# Patient Record
Sex: MALE | Race: WHITE | NOT HISPANIC OR LATINO | Employment: OTHER | URBAN - METROPOLITAN AREA
[De-identification: names, ages, dates, MRNs, and addresses within clinical notes are randomized per-mention and may not be internally consistent; named-entity substitution may affect disease eponyms.]

---

## 2023-03-14 ENCOUNTER — HOSPITAL ENCOUNTER (OUTPATIENT)
Facility: HOSPITAL | Age: 58
Discharge: OTHER INSTITUTION | End: 2023-03-15
Attending: EMERGENCY MEDICINE | Admitting: INTERNAL MEDICINE

## 2023-03-14 DIAGNOSIS — I21.4 NSTEMI (NON-ST ELEVATED MYOCARDIAL INFARCTION): ICD-10-CM

## 2023-03-14 DIAGNOSIS — I48.91 ATRIAL FIBRILLATION: ICD-10-CM

## 2023-03-14 DIAGNOSIS — R06.02 SHORTNESS OF BREATH: ICD-10-CM

## 2023-03-14 DIAGNOSIS — I48.91 ATRIAL FIBRILLATION WITH RVR: ICD-10-CM

## 2023-03-14 DIAGNOSIS — R07.9 CHEST PAIN: ICD-10-CM

## 2023-03-14 PROBLEM — R06.00 DYSPNEA: Status: ACTIVE | Noted: 2023-03-14

## 2023-03-14 PROBLEM — I10 PRIMARY HYPERTENSION: Status: ACTIVE | Noted: 2023-03-14

## 2023-03-14 PROBLEM — Z71.89 ACP (ADVANCE CARE PLANNING): Status: ACTIVE | Noted: 2023-03-14

## 2023-03-14 PROBLEM — R79.89 ELEVATED TROPONIN: Status: ACTIVE | Noted: 2023-03-14

## 2023-03-14 PROBLEM — E78.5 HYPERLIPIDEMIA: Status: ACTIVE | Noted: 2023-03-14

## 2023-03-14 PROBLEM — E66.01 MORBID OBESITY: Status: ACTIVE | Noted: 2023-03-14

## 2023-03-14 LAB
ALBUMIN SERPL BCP-MCNC: 4 G/DL (ref 3.5–5.2)
ALP SERPL-CCNC: 86 U/L (ref 55–135)
ALT SERPL W/O P-5'-P-CCNC: 46 U/L (ref 10–44)
ANION GAP SERPL CALC-SCNC: 11 MMOL/L (ref 8–16)
AST SERPL-CCNC: 29 U/L (ref 10–40)
BASOPHILS # BLD AUTO: 0.07 K/UL (ref 0–0.2)
BASOPHILS NFR BLD: 0.7 % (ref 0–1.9)
BILIRUB SERPL-MCNC: 0.6 MG/DL (ref 0.1–1)
BNP SERPL-MCNC: 23 PG/ML (ref 0–99)
BUN SERPL-MCNC: 16 MG/DL (ref 6–20)
CALCIUM SERPL-MCNC: 9.6 MG/DL (ref 8.7–10.5)
CHLORIDE SERPL-SCNC: 103 MMOL/L (ref 95–110)
CK MB SERPL-MCNC: 24.8 NG/ML (ref 0.1–6.5)
CK MB SERPL-RTO: 8.7 % (ref 0–5)
CK SERPL-CCNC: 285 U/L (ref 20–200)
CO2 SERPL-SCNC: 21 MMOL/L (ref 23–29)
CREAT SERPL-MCNC: 0.9 MG/DL (ref 0.5–1.4)
DIFFERENTIAL METHOD: ABNORMAL
EOSINOPHIL # BLD AUTO: 0.1 K/UL (ref 0–0.5)
EOSINOPHIL NFR BLD: 0.6 % (ref 0–8)
ERYTHROCYTE [DISTWIDTH] IN BLOOD BY AUTOMATED COUNT: 11.8 % (ref 11.5–14.5)
EST. GFR  (NO RACE VARIABLE): >60 ML/MIN/1.73 M^2
ESTIMATED AVG GLUCOSE: 163 MG/DL (ref 68–131)
GLUCOSE SERPL-MCNC: 181 MG/DL (ref 70–110)
HBA1C MFR BLD: 7.3 % (ref 4–5.6)
HCT VFR BLD AUTO: 48.9 % (ref 40–54)
HCV AB SERPL QL IA: NORMAL
HGB BLD-MCNC: 17.2 G/DL (ref 14–18)
HIV 1+2 AB+HIV1 P24 AG SERPL QL IA: NORMAL
IMM GRANULOCYTES # BLD AUTO: 0.05 K/UL (ref 0–0.04)
IMM GRANULOCYTES NFR BLD AUTO: 0.5 % (ref 0–0.5)
INR PPP: 1 (ref 0.8–1.2)
LYMPHOCYTES # BLD AUTO: 1.7 K/UL (ref 1–4.8)
LYMPHOCYTES NFR BLD: 16.2 % (ref 18–48)
MAGNESIUM SERPL-MCNC: 1.8 MG/DL (ref 1.6–2.6)
MCH RBC QN AUTO: 31.2 PG (ref 27–31)
MCHC RBC AUTO-ENTMCNC: 35.2 G/DL (ref 32–36)
MCV RBC AUTO: 89 FL (ref 82–98)
MONOCYTES # BLD AUTO: 0.6 K/UL (ref 0.3–1)
MONOCYTES NFR BLD: 6 % (ref 4–15)
NEUTROPHILS # BLD AUTO: 8.2 K/UL (ref 1.8–7.7)
NEUTROPHILS NFR BLD: 76 % (ref 38–73)
NRBC BLD-RTO: 0 /100 WBC
PLATELET # BLD AUTO: 230 K/UL (ref 150–450)
PMV BLD AUTO: 9.5 FL (ref 9.2–12.9)
POTASSIUM SERPL-SCNC: 4.2 MMOL/L (ref 3.5–5.1)
PROT SERPL-MCNC: 7.1 G/DL (ref 6–8.4)
PROTHROMBIN TIME: 10.2 SEC (ref 9–12.5)
RBC # BLD AUTO: 5.51 M/UL (ref 4.6–6.2)
SODIUM SERPL-SCNC: 135 MMOL/L (ref 136–145)
TROPONIN I SERPL DL<=0.01 NG/ML-MCNC: 0.34 NG/ML (ref 0–0.03)
TROPONIN I SERPL DL<=0.01 NG/ML-MCNC: 1.42 NG/ML (ref 0–0.03)
TSH SERPL DL<=0.005 MIU/L-ACNC: 1.33 UIU/ML (ref 0.4–4)
WBC # BLD AUTO: 10.73 K/UL (ref 3.9–12.7)

## 2023-03-14 PROCEDURE — 83735 ASSAY OF MAGNESIUM: CPT | Performed by: INTERNAL MEDICINE

## 2023-03-14 PROCEDURE — 25000003 PHARM REV CODE 250: Performed by: INTERNAL MEDICINE

## 2023-03-14 PROCEDURE — 71046 XR CHEST PA AND LATERAL: ICD-10-PCS | Mod: 26,,, | Performed by: RADIOLOGY

## 2023-03-14 PROCEDURE — 96361 HYDRATE IV INFUSION ADD-ON: CPT

## 2023-03-14 PROCEDURE — 93010 EKG 12-LEAD: ICD-10-PCS | Mod: ,,, | Performed by: INTERNAL MEDICINE

## 2023-03-14 PROCEDURE — 83036 HEMOGLOBIN GLYCOSYLATED A1C: CPT | Performed by: INTERNAL MEDICINE

## 2023-03-14 PROCEDURE — 84484 ASSAY OF TROPONIN QUANT: CPT | Performed by: EMERGENCY MEDICINE

## 2023-03-14 PROCEDURE — 25000003 PHARM REV CODE 250: Performed by: EMERGENCY MEDICINE

## 2023-03-14 PROCEDURE — 96374 THER/PROPH/DIAG INJ IV PUSH: CPT

## 2023-03-14 PROCEDURE — 93010 ELECTROCARDIOGRAM REPORT: CPT | Mod: ,,, | Performed by: INTERNAL MEDICINE

## 2023-03-14 PROCEDURE — 99285 EMERGENCY DEPT VISIT HI MDM: CPT | Mod: 25

## 2023-03-14 PROCEDURE — 85025 COMPLETE CBC W/AUTO DIFF WBC: CPT | Performed by: EMERGENCY MEDICINE

## 2023-03-14 PROCEDURE — 85610 PROTHROMBIN TIME: CPT | Performed by: EMERGENCY MEDICINE

## 2023-03-14 PROCEDURE — 71275 CT ANGIOGRAPHY CHEST: CPT | Mod: TC

## 2023-03-14 PROCEDURE — G0378 HOSPITAL OBSERVATION PER HR: HCPCS

## 2023-03-14 PROCEDURE — 80053 COMPREHEN METABOLIC PANEL: CPT | Performed by: EMERGENCY MEDICINE

## 2023-03-14 PROCEDURE — 99223 1ST HOSP IP/OBS HIGH 75: CPT | Mod: 25,,, | Performed by: INTERNAL MEDICINE

## 2023-03-14 PROCEDURE — 84443 ASSAY THYROID STIM HORMONE: CPT | Performed by: INTERNAL MEDICINE

## 2023-03-14 PROCEDURE — 71045 X-RAY EXAM CHEST 1 VIEW: CPT | Mod: TC

## 2023-03-14 PROCEDURE — 83880 ASSAY OF NATRIURETIC PEPTIDE: CPT | Performed by: EMERGENCY MEDICINE

## 2023-03-14 PROCEDURE — 84484 ASSAY OF TROPONIN QUANT: CPT | Mod: 91 | Performed by: INTERNAL MEDICINE

## 2023-03-14 PROCEDURE — 71045 XR CHEST AP PORTABLE: ICD-10-PCS | Mod: 26,,, | Performed by: RADIOLOGY

## 2023-03-14 PROCEDURE — 71046 X-RAY EXAM CHEST 2 VIEWS: CPT | Mod: TC

## 2023-03-14 PROCEDURE — 99223 PR INITIAL HOSPITAL CARE,LEVL III: ICD-10-PCS | Mod: 25,,, | Performed by: INTERNAL MEDICINE

## 2023-03-14 PROCEDURE — 86803 HEPATITIS C AB TEST: CPT | Performed by: EMERGENCY MEDICINE

## 2023-03-14 PROCEDURE — 36415 COLL VENOUS BLD VENIPUNCTURE: CPT | Performed by: INTERNAL MEDICINE

## 2023-03-14 PROCEDURE — 71275 CT ANGIOGRAPHY CHEST: CPT | Mod: 26,,, | Performed by: RADIOLOGY

## 2023-03-14 PROCEDURE — 96372 THER/PROPH/DIAG INJ SC/IM: CPT | Mod: 59 | Performed by: INTERNAL MEDICINE

## 2023-03-14 PROCEDURE — 63600175 PHARM REV CODE 636 W HCPCS: Performed by: INTERNAL MEDICINE

## 2023-03-14 PROCEDURE — 71275 CTA CHEST NON CORONARY (PE STUDIES): ICD-10-PCS | Mod: 26,,, | Performed by: RADIOLOGY

## 2023-03-14 PROCEDURE — 71045 X-RAY EXAM CHEST 1 VIEW: CPT | Mod: 26,,, | Performed by: RADIOLOGY

## 2023-03-14 PROCEDURE — 93005 ELECTROCARDIOGRAM TRACING: CPT

## 2023-03-14 PROCEDURE — A4216 STERILE WATER/SALINE, 10 ML: HCPCS | Performed by: INTERNAL MEDICINE

## 2023-03-14 PROCEDURE — 27000221 HC OXYGEN, UP TO 24 HOURS

## 2023-03-14 PROCEDURE — 87389 HIV-1 AG W/HIV-1&-2 AB AG IA: CPT | Performed by: EMERGENCY MEDICINE

## 2023-03-14 PROCEDURE — 99497 PR ADVNCD CARE PLAN 30 MIN: ICD-10-PCS | Mod: 25,,, | Performed by: INTERNAL MEDICINE

## 2023-03-14 PROCEDURE — 99497 ADVNCD CARE PLAN 30 MIN: CPT | Mod: 25,,, | Performed by: INTERNAL MEDICINE

## 2023-03-14 PROCEDURE — 71046 X-RAY EXAM CHEST 2 VIEWS: CPT | Mod: 26,,, | Performed by: RADIOLOGY

## 2023-03-14 PROCEDURE — 82553 CREATINE MB FRACTION: CPT | Performed by: INTERNAL MEDICINE

## 2023-03-14 PROCEDURE — 25500020 PHARM REV CODE 255: Performed by: INTERNAL MEDICINE

## 2023-03-14 RX ORDER — NALOXONE HCL 0.4 MG/ML
0.02 VIAL (ML) INJECTION
Status: DISCONTINUED | OUTPATIENT
Start: 2023-03-14 | End: 2023-03-15 | Stop reason: HOSPADM

## 2023-03-14 RX ORDER — ENOXAPARIN SODIUM 150 MG/ML
1 INJECTION SUBCUTANEOUS EVERY 24 HOURS
Status: DISCONTINUED | OUTPATIENT
Start: 2023-03-14 | End: 2023-03-14 | Stop reason: SDUPTHER

## 2023-03-14 RX ORDER — SODIUM CHLORIDE 0.9 % (FLUSH) 0.9 %
10 SYRINGE (ML) INJECTION EVERY 8 HOURS
Status: DISCONTINUED | OUTPATIENT
Start: 2023-03-14 | End: 2023-03-15 | Stop reason: HOSPADM

## 2023-03-14 RX ORDER — LISINOPRIL 20 MG/1
20 TABLET ORAL DAILY
COMMUNITY

## 2023-03-14 RX ORDER — SODIUM,POTASSIUM PHOSPHATES 280-250MG
2 POWDER IN PACKET (EA) ORAL
Status: DISCONTINUED | OUTPATIENT
Start: 2023-03-14 | End: 2023-03-15 | Stop reason: HOSPADM

## 2023-03-14 RX ORDER — LANOLIN ALCOHOL/MO/W.PET/CERES
800 CREAM (GRAM) TOPICAL
Status: DISCONTINUED | OUTPATIENT
Start: 2023-03-14 | End: 2023-03-15 | Stop reason: HOSPADM

## 2023-03-14 RX ORDER — NAPROXEN SODIUM 220 MG/1
81 TABLET, FILM COATED ORAL DAILY
COMMUNITY

## 2023-03-14 RX ORDER — ENOXAPARIN SODIUM 150 MG/ML
0.8 INJECTION SUBCUTANEOUS
Status: DISCONTINUED | OUTPATIENT
Start: 2023-03-14 | End: 2023-03-15 | Stop reason: HOSPADM

## 2023-03-14 RX ORDER — ACETAMINOPHEN 325 MG/1
650 TABLET ORAL EVERY 8 HOURS PRN
Status: DISCONTINUED | OUTPATIENT
Start: 2023-03-14 | End: 2023-03-15 | Stop reason: HOSPADM

## 2023-03-14 RX ORDER — NAPROXEN SODIUM 220 MG/1
324 TABLET, FILM COATED ORAL
Status: COMPLETED | OUTPATIENT
Start: 2023-03-14 | End: 2023-03-14

## 2023-03-14 RX ORDER — SODIUM CHLORIDE 9 MG/ML
1000 INJECTION, SOLUTION INTRAVENOUS
Status: COMPLETED | OUTPATIENT
Start: 2023-03-14 | End: 2023-03-14

## 2023-03-14 RX ORDER — METOPROLOL SUCCINATE 25 MG/1
25 TABLET, EXTENDED RELEASE ORAL DAILY
Status: DISCONTINUED | OUTPATIENT
Start: 2023-03-14 | End: 2023-03-15 | Stop reason: HOSPADM

## 2023-03-14 RX ORDER — GLUCAGON 1 MG
1 KIT INJECTION
Status: DISCONTINUED | OUTPATIENT
Start: 2023-03-14 | End: 2023-03-15 | Stop reason: HOSPADM

## 2023-03-14 RX ORDER — POLYETHYLENE GLYCOL 3350 17 G/17G
17 POWDER, FOR SOLUTION ORAL DAILY PRN
Status: DISCONTINUED | OUTPATIENT
Start: 2023-03-14 | End: 2023-03-15 | Stop reason: HOSPADM

## 2023-03-14 RX ORDER — NAPROXEN SODIUM 220 MG/1
81 TABLET, FILM COATED ORAL DAILY
Status: DISCONTINUED | OUTPATIENT
Start: 2023-03-15 | End: 2023-03-15 | Stop reason: HOSPADM

## 2023-03-14 RX ORDER — METOPROLOL TARTRATE 1 MG/ML
5 INJECTION, SOLUTION INTRAVENOUS
Status: COMPLETED | OUTPATIENT
Start: 2023-03-14 | End: 2023-03-14

## 2023-03-14 RX ORDER — LISINOPRIL 20 MG/1
20 TABLET ORAL DAILY
Status: DISCONTINUED | OUTPATIENT
Start: 2023-03-15 | End: 2023-03-15 | Stop reason: HOSPADM

## 2023-03-14 RX ORDER — ONDANSETRON 2 MG/ML
4 INJECTION INTRAMUSCULAR; INTRAVENOUS EVERY 8 HOURS PRN
Status: DISCONTINUED | OUTPATIENT
Start: 2023-03-14 | End: 2023-03-15 | Stop reason: HOSPADM

## 2023-03-14 RX ORDER — HYDROCODONE BITARTRATE AND ACETAMINOPHEN 5; 325 MG/1; MG/1
1 TABLET ORAL EVERY 6 HOURS PRN
Status: DISCONTINUED | OUTPATIENT
Start: 2023-03-14 | End: 2023-03-15 | Stop reason: HOSPADM

## 2023-03-14 RX ADMIN — ASPIRIN 324 MG: 81 TABLET, CHEWABLE ORAL at 12:03

## 2023-03-14 RX ADMIN — Medication 10 ML: at 09:03

## 2023-03-14 RX ADMIN — SODIUM CHLORIDE 1000 ML: 9 INJECTION, SOLUTION INTRAVENOUS at 02:03

## 2023-03-14 RX ADMIN — ENOXAPARIN SODIUM 105 MG: 150 INJECTION SUBCUTANEOUS at 06:03

## 2023-03-14 RX ADMIN — METOROPROLOL TARTRATE 5 MG: 5 INJECTION, SOLUTION INTRAVENOUS at 02:03

## 2023-03-14 RX ADMIN — IOHEXOL 100 ML: 350 INJECTION, SOLUTION INTRAVENOUS at 04:03

## 2023-03-14 RX ADMIN — METOPROLOL SUCCINATE 25 MG: 25 TABLET, EXTENDED RELEASE ORAL at 05:03

## 2023-03-14 NOTE — ASSESSMENT & PLAN NOTE
Advance Care Planning     Date: 03/14/2023    Living Will  During this visit, I engaged the patient  in the advance care planning process.  The patient and I reviewed the role for advance directives and their purpose in directing future healthcare if the patient's unable to speak for him.  At this point in time, the patient does have full decision-making capacity.  We discussed different extreme health states that he could experience, and reviewed what kind of medical care he would want in those situations.  The patient communicated that if he were comatose and had little chance of a meaningful recovery, he would want machines/life-sustaining treatments used. I spent a total of 16 minutes engaging the patient in this advance care planning discussion.

## 2023-03-14 NOTE — ASSESSMENT & PLAN NOTE
Continue lisinopril as before.  Tele monitoring.  Use intravenous hydralazine 10 mg IV q.4 hours p.r.n. for systolic blood pressure above 160 mmHg.

## 2023-03-14 NOTE — HPI
Patient is a 57-year-old  male with past medical history significant for hypertension, hyperlipidemia, morbid obesity by medical criteria who is being admitted to Hospital Medicine from [facility] Emergency Room with complaints of progressively worsening shortness of breath and chest pain for 3-4 days.  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  Patient denies any personal or family history of any blood clots.  For the past 2-3 days patient is experiencing chest heaviness, retrosternal area, nonradiating with associated shortness of breath.  Shortness of breath is mostly exertional.  Patient denies any associated paroxysmal nocturnal dyspnea but feels better upon sitting up.  Patient states this shortness of breath is making him anxious.  Patient drinks 2 cups of coffee daily and drinks alcohol on occasional basis.  Patient went to urgent care facility about 4 days ago with similar symptoms as he ran out his lisinopril prescription.  Reportedly patient had blood work completed.  No recent fever, cough, expectoration, sick contact or prior history of heart disease reported.  Upon arrival to the emergency room patient noted to be in atrial fibrillation with rapid ventricular response with heart rate ranging between 120-140 beats per minute.  Patient's atrial fibrillation converted to normal sinus rhythm spontaneously after receiving intravenous Lopressor 5 mg x 1.  Currently patient is on 2 liter/minute supplemental oxygen via nasal cannula.

## 2023-03-14 NOTE — ASSESSMENT & PLAN NOTE
Patient with Paroxysmal (<7 days) atrial fibrillation which is controlled currently with Lisinopril. . Patient is currently in sinus rhythm.[id number] Score:  Follow 2D echocardiogram.  For now continue Lovenox 1 milligram/kilogram subQ q.12h.  Decision about long-term anticoagulation therapy will be deferred to cardiology team.  - check TSH.  - serial cardiac enzymes.  - follow CT angiogram of the chest to rule out pulmonary embolism.  - add Toprol-XL 25 mg daily.   - patient to reduce caffeine intake and avoid alcohol consumption.  Counseling completed.

## 2023-03-14 NOTE — ED NOTES
Pt denies CP or discomfort. No SOB. No nausea. No dizziness. Resp equal, nonlabored. LS CTA. Skin P/W/D. HRRR noted at this time; NSR on CM. MD notified. Pt in NAD; resting comfortably at this time. No wants, needs voiced.  Will CTM.

## 2023-03-14 NOTE — ASSESSMENT & PLAN NOTE
Multifactorial, likely precipitated by new onset atrial fibrillation with rapid ventricular response.  Treat atrial fibrillation.  Weight loss will be beneficial.  May have obstructive sleep apnea as well.  Patient will benefit with outpatient polysomnography.

## 2023-03-14 NOTE — SUBJECTIVE & OBJECTIVE
No past medical history on file.    No past surgical history on file.    Review of patient's allergies indicates:  No Known Allergies    No current facility-administered medications on file prior to encounter.     Current Outpatient Medications on File Prior to Encounter   Medication Sig    aspirin 81 MG Chew Take 81 mg by mouth once daily.    lisinopriL (PRINIVIL,ZESTRIL) 20 MG tablet Take 20 mg by mouth once daily.     Family History    None       Tobacco Use    Smoking status: Not on file    Smokeless tobacco: Not on file   Substance and Sexual Activity    Alcohol use: Not on file    Drug use: Not on file    Sexual activity: Not on file     Review of Systems   Constitutional:  Negative for chills and fever.   HENT:  Negative for congestion and sore throat.    Eyes:  Negative for visual disturbance.   Respiratory:  Negative for cough and shortness of breath.    Cardiovascular:  Negative for chest pain and palpitations.   Gastrointestinal:  Negative for abdominal pain, constipation, diarrhea, nausea and vomiting.   Endocrine: Negative for cold intolerance and heat intolerance.   Genitourinary:  Negative for dysuria and hematuria.   Musculoskeletal:  Negative for arthralgias and myalgias.        Bilateral Homans signs negative.   Skin:  Negative for rash.   Neurological:  Negative for tremors and seizures.   Hematological:  Negative for adenopathy. Does not bruise/bleed easily.   Objective:     Vital Signs (Most Recent):  Temp: 97.9 °F (36.6 °C) (03/14/23 1232)  Pulse: 96 (03/14/23 1533)  Resp: 18 (03/14/23 1533)  BP: (!) 133/90 (03/14/23 1533)  SpO2: 96 % (03/14/23 1533)   Vital Signs (24h Range):  Temp:  [97.9 °F (36.6 °C)] 97.9 °F (36.6 °C)  Pulse:  [] 96  Resp:  [15-22] 18  SpO2:  [94 %-96 %] 96 %  BP: (107-140)/(51-97) 133/90     Weight: 134.7 kg (297 lb)  Body mass index is 42.62 kg/m².    Physical Exam  Vitals and nursing note reviewed.   Constitutional:       Appearance: Normal appearance. He is  well-developed.   HENT:      Head: Normocephalic and atraumatic.      Nose: Nose normal. No septal deviation.   Eyes:      Conjunctiva/sclera: Conjunctivae normal.      Pupils: Pupils are equal, round, and reactive to light.   Neck:      Thyroid: No thyroid mass.      Vascular: No JVD.      Trachea: No tracheal tenderness or tracheal deviation.   Cardiovascular:      Rate and Rhythm: Normal rate and regular rhythm.      Heart sounds: S1 normal and S2 normal. No murmur heard.    No friction rub. No gallop.   Pulmonary:      Effort: Pulmonary effort is normal.      Breath sounds: Normal breath sounds. No decreased breath sounds, wheezing, rhonchi or rales.   Abdominal:      General: Bowel sounds are normal. There is no distension.      Palpations: Abdomen is soft. There is no hepatomegaly, splenomegaly or mass.      Tenderness: There is no abdominal tenderness.   Musculoskeletal:      Comments: Bilateral Homans signs negative   Skin:     General: Skin is warm.      Findings: No rash.   Neurological:      General: No focal deficit present.      Mental Status: He is alert and oriented to person, place, and time.      Cranial Nerves: No cranial nerve deficit.      Sensory: No sensory deficit.   Psychiatric:         Mood and Affect: Mood normal.         Behavior: Behavior normal.         CRANIAL NERVES     CN III, IV, VI   Pupils are equal, round, and reactive to light.     Significant Labs: All pertinent labs within the past 24 hours have been reviewed.  CBC:   Recent Labs   Lab 03/14/23  1239   WBC 10.73   HGB 17.2   HCT 48.9        CMP:   Recent Labs   Lab 03/14/23  1239   *   K 4.2      CO2 21*   *   BUN 16   CREATININE 0.9   CALCIUM 9.6   PROT 7.1   ALBUMIN 4.0   BILITOT 0.6   ALKPHOS 86   AST 29   ALT 46*   ANIONGAP 11     Coagulation:   Recent Labs   Lab 03/14/23  1239   INR 1.0     Troponin:   Recent Labs   Lab 03/14/23  1239   TROPONINI 0.342*   BNP: 23  TSH: No results for input(s): TSH  in the last 4320 hours.    Significant Imaging:   CXR:  Pulmonary hypoinflation without focal consolidation. As deemed clinically necessary, further evaluation may be obtained with dedicated PA and lateral views of the chest.    CXR: No acute chest disease.    CTA Chest: Pending.     EKG: Initial EKG showing atrial fibrillation with rapid ventricular response 123 beats per minute.  Repeat EKG shows normal sinus rhythm 94 beats per minute without any acute ST and T-wave changes.

## 2023-03-14 NOTE — NURSING
Patient received at this time to floor. Oxygen in use at 2L. No distress noted. Denies chest pain. VS stable. Tele monitor in place.

## 2023-03-14 NOTE — ASSESSMENT & PLAN NOTE
Likely secondary to tachycardia (new onset atrial fibrillation with rapid ventricular response).  Trend serial cardiac enzymes.  Follow Cardiology recommendations.

## 2023-03-14 NOTE — ED PROVIDER NOTES
Encounter Date: 3/14/2023       History     Chief Complaint   Patient presents with    Shortness of Breath     New onset A-Fib     57-year-old male, here from home via private vehicle, for evaluation and treatment of chest discomfort and shortness of breath.  Current symptoms started on Sunday, 3 days ago, but patient admits that about 1 month ago he had similar symptoms which resolved before he had a chance to be evaluated.  Symptoms worsened with activity, and better with rest.  He also finds that his symptoms are better if he does not recliner fully.  He admits to history of hypertension, but denies history of heart disease or lung disease.  He is from [facility], and is here working for few months.  In triage, an EKG reveals the patient is in atrial fibrillation with rapid ventricular response.  Heart rate varies between 110 and 140, trending toward the 120s.    Review of patient's allergies indicates:  No Known Allergies  No past medical history on file.  No past surgical history on file.  No family history on file.     Review of Systems   Constitutional:  Positive for fatigue.   HENT: Negative.     Eyes: Negative.    Respiratory:  Positive for shortness of breath.    Cardiovascular: Negative.  Negative for chest pain, palpitations and leg swelling.   Gastrointestinal: Negative.    Endocrine: Negative.    Genitourinary: Negative.    Musculoskeletal: Negative.    Allergic/Immunologic: Negative.    Neurological: Negative.    Hematological: Negative.    Psychiatric/Behavioral: Negative.       Physical Exam     Initial Vitals [03/14/23 1232]   BP Pulse Resp Temp SpO2   110/80 104 (!) 22 97.9 °F (36.6 °C) (!) 94 %      MAP       --         Physical Exam    Nursing note and vitals reviewed.  Constitutional: He appears well-developed and well-nourished. He is not diaphoretic. No distress.   Obese adult male, no acute distress   HENT:   Head: Normocephalic and atraumatic.   Nose: Nose normal.   Mouth/Throat:  Oropharynx is clear and moist. No oropharyngeal exudate.   Eyes: Conjunctivae and EOM are normal. Pupils are equal, round, and reactive to light. No scleral icterus.   Neck: Neck supple. No JVD present.   Normal range of motion.  Cardiovascular:  Normal heart sounds and intact distal pulses.           No murmur heard.  Irregularly irregular, and tachycardic   Pulmonary/Chest: Breath sounds normal. No stridor. No respiratory distress. He has no wheezes. He has no rhonchi. He has no rales.   Abdominal: Abdomen is soft. Bowel sounds are normal. He exhibits no distension. There is no abdominal tenderness.   Musculoskeletal:         General: No tenderness or edema. Normal range of motion.      Cervical back: Normal range of motion and neck supple.     Neurological: He is alert and oriented to person, place, and time. He has normal strength. No cranial nerve deficit or sensory deficit. GCS score is 15. GCS eye subscore is 4. GCS verbal subscore is 5. GCS motor subscore is 6.   Skin: Skin is warm and dry. Capillary refill takes less than 2 seconds. No rash noted. No erythema.   Psychiatric: He has a normal mood and affect. His behavior is normal.       ED Course   Critical Care    Date/Time: 3/14/2023 3:36 PM  Performed by: Kenji Enciso MD  Authorized by: Kenji Enciso MD   Direct patient critical care time: 28 minutes  Ordering / reviewing critical care time: 6 minutes  Documentation critical care time: 12 minutes  Consulting other physicians critical care time: 5 minutes  Total critical care time (exclusive of procedural time) : 51 minutes  Critical care time was exclusive of separately billable procedures and treating other patients and teaching time.  Critical care was necessary to treat or prevent imminent or life-threatening deterioration of the following conditions: cardiac failure.  Critical care was time spent personally by me on the following activities: discussions with consultants, evaluation of patient's  response to treatment, examination of patient, obtaining history from patient or surrogate, ordering and performing treatments and interventions, ordering and review of laboratory studies, ordering and review of radiographic studies, pulse oximetry, re-evaluation of patient's condition and review of old charts.      Labs Reviewed   CBC W/ AUTO DIFFERENTIAL - Abnormal; Notable for the following components:       Result Value    MCH 31.2 (*)     Gran # (ANC) 8.2 (*)     Immature Grans (Abs) 0.05 (*)     Gran % 76.0 (*)     Lymph % 16.2 (*)     All other components within normal limits    Narrative:     Release to patient->Immediate   COMPREHENSIVE METABOLIC PANEL - Abnormal; Notable for the following components:    Sodium 135 (*)     CO2 21 (*)     Glucose 181 (*)     ALT 46 (*)     All other components within normal limits    Narrative:     Release to patient->Immediate   TROPONIN I - Abnormal; Notable for the following components:    Troponin I 0.342 (*)     All other components within normal limits    Narrative:     Release to patient->Immediate   B-TYPE NATRIURETIC PEPTIDE    Narrative:     Release to patient->Immediate   PROTIME-INR    Narrative:     Release to patient->Immediate   HIV 1 / 2 ANTIBODY   HEPATITIS C ANTIBODY     EKG Readings: (Independently Interpreted)   EKG personally reviewed by me shows atrial fibrillation with RVR and occasional PVCs.  Nonspecific ST changes, 123 beats per minute, QRS 84, .  No obvious ST elevations or depressions.      Second EKG, after being given metoprolol 5 mg, shows normal sinus rhythm, sinus arrhythmia, 94 beats per minute, OK interval 126, .  No ST elevations or depressions noted.     Imaging Results              CTA Chest Non-Coronary (PE Studies) (In process)                      X-Ray Chest PA And Lateral (Final result)  Result time 03/14/23 14:04:26      Final result by Napoleon Obregon MD (03/14/23 14:04:26)                   Impression:      No  acute chest disease.      Electronically signed by: Napoleon Obregon  Date:    03/14/2023  Time:    14:04               Narrative:    EXAMINATION:  XR CHEST PA AND LATERAL    CLINICAL HISTORY:  Unspecified atrial fibrillation    TECHNIQUE:  PA and lateral views of the chest were performed.    COMPARISON:  03/14/2023.    FINDINGS:  The lungs are clear.  No focal consolidation.  Heart size is normal.  Mediastinal contours unremarkable.    Bony thorax intact.                                       X-Ray Chest AP Portable (Final result)  Result time 03/14/23 12:57:37      Final result by Napoleon Obregon MD (03/14/23 12:57:37)                   Impression:      Pulmonary hypoinflation without focal consolidation.    As deemed clinically necessary, further evaluation may be obtained with dedicated PA and lateral views of the chest.      Electronically signed by: Napoleon Obregon  Date:    03/14/2023  Time:    12:57               Narrative:    EXAMINATION:  XR CHEST AP PORTABLE    CLINICAL HISTORY:  Shortness of breath    TECHNIQUE:  Portable view of the chest was performed.    COMPARISON:  None.    FINDINGS:  Pulmonary hypoinflation crowding of the bronchopulmonary vessels.  No focal consolidation.  Heart size is mildly enlarged.  Trachea midline.    Bony thorax intact.                                    X-Rays:   Independently Interpreted Readings:   Other Readings:  Chest x-ray, personally reviewed by me, shows no evidence of pulmonary infiltrate, no pneumothorax, normal cardiac silhouette, normal skeletal structures.  Medications   enoxaparin injection 135 mg (has no administration in time range)   aspirin chewable tablet 324 mg (0 mg Oral Hold 3/14/23 1335)   metoprolol injection 5 mg (5 mg Intravenous Given 3/14/23 1436)   0.9%  NaCl infusion (1,000 mLs Intravenous New Bag 3/14/23 1415)     Medical Decision Making:   Differential Diagnosis:   Atrial fibrillation, AFib RVR, myocardial infarction, COPD, CHF, etc.  ED  Management:  Labs ordered, chest x-ray as well.  Labs show an elevated troponin, likely secondary to increased load from the AFib with RVR.  Patient was given Lopressor 5 mg, and he has converted to a normal sinus rhythm.  I believe he will need admission for further evaluation and treatment.  I have spoken to the hospitalist, Dr. Aponte, who has seen the patient and will admit.  He would like to add on a CT of the chest, PE protocol, as well as dose of Lovenox prior to transfer to his floor bed.                        Clinical Impression:   Final diagnoses:  [R07.9] Chest pain  [R06.02] Shortness of breath  [I48.91] Atrial fibrillation with RVR  [I48.91] Atrial fibrillation        ED Disposition Condition    Observation Stable                Kenji Enciso MD  03/14/23 [id number]

## 2023-03-14 NOTE — H&P
[facility] Emergency Dept  [facility]  History & Physical    Patient Name: Ramon Dhaliwal  MRN: [id number]  Patient Class: OP- Observation  Admission Date: 3/14/2023  Attending Physician: Chio Aponte MD   Primary Care Provider: Primary Doctor No         Patient information was obtained from patient and ER records.     Subjective:     Principal Problem:Atrial fibrillation with RVR    Chief Complaint:   Chief Complaint   Patient presents with    Shortness of Breath     New onset A-Fib        HPI: Patient is a 57-year-old  male with past medical history significant for hypertension, hyperlipidemia, morbid obesity by medical criteria who is being admitted to Hospital Medicine from [facility] Emergency Room with complaints of progressively worsening shortness of breath and chest pain for 3-4 days.  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  Patient denies any personal or family history of any blood clots.  For the past 2-3 days patient is experiencing chest heaviness, retrosternal area, nonradiating with associated shortness of breath.  Shortness of breath is mostly exertional.  Patient denies any associated paroxysmal nocturnal dyspnea but feels better upon sitting up.  Patient states this shortness of breath is making him anxious.  Patient drinks 2 cups of coffee daily and drinks alcohol on occasional basis.  Patient went to urgent care facility about 4 days ago with similar symptoms as he ran out his lisinopril prescription.  Reportedly patient had blood work completed.  No recent fever, cough, expectoration, sick contact or prior history of heart disease reported.  Upon arrival to the emergency room patient noted to be in atrial fibrillation with rapid ventricular response with heart rate ranging between 120-140 beats per minute.  Patient's atrial fibrillation converted to normal sinus rhythm  spontaneously after receiving intravenous Lopressor 5 mg x 1.  Currently patient is on 2 liter/minute supplemental oxygen via nasal cannula.            No past medical history on file.    No past surgical history on file.    Review of patient's allergies indicates:  No Known Allergies    No current facility-administered medications on file prior to encounter.     Current Outpatient Medications on File Prior to Encounter   Medication Sig    aspirin 81 MG Chew Take 81 mg by mouth once daily.    lisinopriL (PRINIVIL,ZESTRIL) 20 MG tablet Take 20 mg by mouth once daily.     Family History    None       Tobacco Use    Smoking status: Not on file    Smokeless tobacco: Not on file   Substance and Sexual Activity    Alcohol use: Not on file    Drug use: Not on file    Sexual activity: Not on file     Review of Systems   Constitutional:  Negative for chills and fever.   HENT:  Negative for congestion and sore throat.    Eyes:  Negative for visual disturbance.   Respiratory:  Negative for cough and shortness of breath.    Cardiovascular:  Negative for chest pain and palpitations.   Gastrointestinal:  Negative for abdominal pain, constipation, diarrhea, nausea and vomiting.   Endocrine: Negative for cold intolerance and heat intolerance.   Genitourinary:  Negative for dysuria and hematuria.   Musculoskeletal:  Negative for arthralgias and myalgias.        Bilateral Homans signs negative.   Skin:  Negative for rash.   Neurological:  Negative for tremors and seizures.   Hematological:  Negative for adenopathy. Does not bruise/bleed easily.   Objective:     Vital Signs (Most Recent):  Temp: 97.9 °F (36.6 °C) (03/14/23 1232)  Pulse: 96 (03/14/23 1533)  Resp: 18 (03/14/23 1533)  BP: (!) 133/90 (03/14/23 1533)  SpO2: 96 % (03/14/23 1533)   Vital Signs (24h Range):  Temp:  [97.9 °F (36.6 °C)] 97.9 °F (36.6 °C)  Pulse:  [] 96  Resp:  [15-22] 18  SpO2:  [94 %-96 %] 96 %  BP: (107-140)/(51-97) 133/90     Weight: 134.7 kg (297  lb)  Body mass index is 42.62 kg/m².    Physical Exam  Vitals and nursing note reviewed.   Constitutional:       Appearance: Normal appearance. He is well-developed.   HENT:      Head: Normocephalic and atraumatic.      Nose: Nose normal. No septal deviation.   Eyes:      Conjunctiva/sclera: Conjunctivae normal.      Pupils: Pupils are equal, round, and reactive to light.   Neck:      Thyroid: No thyroid mass.      Vascular: No JVD.      Trachea: No tracheal tenderness or tracheal deviation.   Cardiovascular:      Rate and Rhythm: Normal rate and regular rhythm.      Heart sounds: S1 normal and S2 normal. No murmur heard.    No friction rub. No gallop.   Pulmonary:      Effort: Pulmonary effort is normal.      Breath sounds: Normal breath sounds. No decreased breath sounds, wheezing, rhonchi or rales.   Abdominal:      General: Bowel sounds are normal. There is no distension.      Palpations: Abdomen is soft. There is no hepatomegaly, splenomegaly or mass.      Tenderness: There is no abdominal tenderness.   Musculoskeletal:      Comments: Bilateral Homans signs negative   Skin:     General: Skin is warm.      Findings: No rash.   Neurological:      General: No focal deficit present.      Mental Status: He is alert and oriented to person, place, and time.      Cranial Nerves: No cranial nerve deficit.      Sensory: No sensory deficit.   Psychiatric:         Mood and Affect: Mood normal.         Behavior: Behavior normal.         CRANIAL NERVES     CN III, IV, VI   Pupils are equal, round, and reactive to light.     Significant Labs: All pertinent labs within the past 24 hours have been reviewed.  CBC:   Recent Labs   Lab 03/14/23  1239   WBC 10.73   HGB 17.2   HCT 48.9        CMP:   Recent Labs   Lab 03/14/23  1239   *   K 4.2      CO2 21*   *   BUN 16   CREATININE 0.9   CALCIUM 9.6   PROT 7.1   ALBUMIN 4.0   BILITOT 0.6   ALKPHOS 86   AST 29   ALT 46*   ANIONGAP 11     Coagulation:    Recent Labs   Lab 03/14/23  1239   INR 1.0     Troponin:   Recent Labs   Lab 03/14/23  1239   TROPONINI 0.342*   BNP: 23  TSH: No results for input(s): TSH in the last 4320 hours.    Significant Imaging:   CXR:  Pulmonary hypoinflation without focal consolidation. As deemed clinically necessary, further evaluation may be obtained with dedicated PA and lateral views of the chest.    CXR: No acute chest disease.    CTA Chest: Pending.     EKG: Initial EKG showing atrial fibrillation with rapid ventricular response 123 beats per minute.  Repeat EKG shows normal sinus rhythm 94 beats per minute without any acute ST and T-wave changes.        Assessment/Plan:     * Atrial fibrillation with RVR, new onset  Patient with Paroxysmal (<7 days) atrial fibrillation which is controlled currently with Lisinopril. . Patient is currently in sinus rhythm.[id number] Score:  Follow 2D echocardiogram.  For now continue Lovenox 1 milligram/kilogram subQ q.12h.  Decision about long-term anticoagulation therapy will be deferred to cardiology team.  - check TSH.  - serial cardiac enzymes.  - follow CT angiogram of the chest to rule out pulmonary embolism.  - add Toprol-XL 25 mg daily.   - patient to reduce caffeine intake and avoid alcohol consumption.  Counseling completed.      ACP (advance care planning)  Advance Care Planning     Date: 03/14/2023    Living Will  During this visit, I engaged the patient  in the advance care planning process.  The patient and I reviewed the role for advance directives and their purpose in directing future healthcare if the patient's unable to speak for him.  At this point in time, the patient does have full decision-making capacity.  We discussed different extreme health states that he could experience, and reviewed what kind of medical care he would want in those situations.  The patient communicated that if he were comatose and had little chance of a meaningful recovery, he would want  machines/life-sustaining treatments used. I spent a total of 16 minutes engaging the patient in this advance care planning discussion.                Elevated troponin  Likely secondary to tachycardia (new onset atrial fibrillation with rapid ventricular response).  Trend serial cardiac enzymes.  Follow Cardiology recommendations.      Hyperlipidemia  Check fasting lipid profile.  Patient will likely benefit from initiation of statin therapy.      Primary hypertension  Continue lisinopril as before.  Tele monitoring.  Use intravenous hydralazine 10 mg IV q.4 hours p.r.n. for systolic blood pressure above 160 mmHg.      Dyspnea  Multifactorial, likely precipitated by new onset atrial fibrillation with rapid ventricular response.  Treat atrial fibrillation.  Weight loss will be beneficial.  May have obstructive sleep apnea as well.  Patient will benefit with outpatient polysomnography.      Morbid obesity  Body mass index is 42.62 kg/m². Morbid obesity complicates all aspects of disease management from diagnostic modalities to treatment. Weight loss encouraged and health benefits explained to patient.           VTE Risk Mitigation (From admission, onward)           Ordered     enoxaparin injection 135 mg  Daily         03/14/23 1532                   Addendum 4: 16 pm:  No CT evidence of thromboembolic pulmonary disease.  No active pulmonary process noted.    Chio Aponte MD  Department of Hospital Medicine   [place] - Emergency Dept

## 2023-03-14 NOTE — NURSING
Consulted Dr. Yanez for cardiology. Per MD magnesium labs to be completed, if magnesium is low 2gm Mag sulfate to be administered IV one time dose

## 2023-03-14 NOTE — ASSESSMENT & PLAN NOTE
Body mass index is 42.62 kg/m². Morbid obesity complicates all aspects of disease management from diagnostic modalities to treatment. Weight loss encouraged and health benefits explained to patient.

## 2023-03-15 VITALS
WEIGHT: 302 LBS | SYSTOLIC BLOOD PRESSURE: 103 MMHG | RESPIRATION RATE: 18 BRPM | HEART RATE: 80 BPM | OXYGEN SATURATION: 93 % | BODY MASS INDEX: 43.23 KG/M2 | HEIGHT: 70 IN | DIASTOLIC BLOOD PRESSURE: 67 MMHG | TEMPERATURE: 99 F

## 2023-03-15 LAB
ALBUMIN SERPL BCP-MCNC: 3.7 G/DL (ref 3.5–5.2)
ALP SERPL-CCNC: 64 U/L (ref 55–135)
ALT SERPL W/O P-5'-P-CCNC: 66 U/L (ref 10–44)
ANION GAP SERPL CALC-SCNC: 10 MMOL/L (ref 8–16)
AORTIC ROOT ANNULUS: 3.34 CM
AORTIC VALVE CUSP SEPERATION: 2.14 CM
AST SERPL-CCNC: 51 U/L (ref 10–40)
BASOPHILS # BLD AUTO: 0.07 K/UL (ref 0–0.2)
BASOPHILS NFR BLD: 0.8 % (ref 0–1.9)
BILIRUB SERPL-MCNC: 0.7 MG/DL (ref 0.1–1)
BSA FOR ECHO PROCEDURE: 2.6 M2
BUN SERPL-MCNC: 17 MG/DL (ref 6–20)
CALCIUM SERPL-MCNC: 8.9 MG/DL (ref 8.7–10.5)
CHLORIDE SERPL-SCNC: 103 MMOL/L (ref 95–110)
CHOLEST SERPL-MCNC: 166 MG/DL (ref 120–199)
CHOLEST/HDLC SERPL: 5.4 {RATIO} (ref 2–5)
CK MB SERPL-MCNC: 37.6 NG/ML (ref 0.1–6.5)
CK MB SERPL-MCNC: 39.9 NG/ML (ref 0.1–6.5)
CK MB SERPL-RTO: 9.8 % (ref 0–5)
CK MB SERPL-RTO: 9.9 % (ref 0–5)
CK SERPL-CCNC: 383 U/L (ref 20–200)
CK SERPL-CCNC: 405 U/L (ref 20–200)
CO2 SERPL-SCNC: 24 MMOL/L (ref 23–29)
CREAT SERPL-MCNC: 0.8 MG/DL (ref 0.5–1.4)
CV ECHO LV RWT: 0.71 CM
DIFFERENTIAL METHOD: ABNORMAL
DOP CALC LVOT AREA: 3.5 CM2
DOP CALC LVOT DIAMETER: 2.12 CM
E WAVE DECELERATION TIME: 168.32 MSEC
E/A RATIO: 1
ECHO LV POSTERIOR WALL: 1.41 CM (ref 0.6–1.1)
EJECTION FRACTION: 59 %
EOSINOPHIL # BLD AUTO: 0.2 K/UL (ref 0–0.5)
EOSINOPHIL NFR BLD: 1.7 % (ref 0–8)
ERYTHROCYTE [DISTWIDTH] IN BLOOD BY AUTOMATED COUNT: 12.1 % (ref 11.5–14.5)
EST. GFR  (NO RACE VARIABLE): >60 ML/MIN/1.73 M^2
FRACTIONAL SHORTENING: 30 % (ref 28–44)
GLUCOSE SERPL-MCNC: 150 MG/DL (ref 70–110)
HCT VFR BLD AUTO: 45.6 % (ref 40–54)
HDLC SERPL-MCNC: 31 MG/DL (ref 40–75)
HDLC SERPL: 18.7 % (ref 20–50)
HGB BLD-MCNC: 15.7 G/DL (ref 14–18)
IMM GRANULOCYTES # BLD AUTO: 0.04 K/UL (ref 0–0.04)
IMM GRANULOCYTES NFR BLD AUTO: 0.4 % (ref 0–0.5)
INTERVENTRICULAR SEPTUM: 1.24 CM (ref 0.6–1.1)
LDLC SERPL CALC-MCNC: 99 MG/DL (ref 63–159)
LEFT ATRIUM SIZE: 3.25 CM
LEFT INTERNAL DIMENSION IN SYSTOLE: 2.77 CM (ref 2.1–4)
LEFT VENTRICLE DIASTOLIC VOLUME INDEX: 27.8 ML/M2
LEFT VENTRICLE DIASTOLIC VOLUME: 69.23 ML
LEFT VENTRICLE MASS INDEX: 77 G/M2
LEFT VENTRICLE SYSTOLIC VOLUME INDEX: 11.6 ML/M2
LEFT VENTRICLE SYSTOLIC VOLUME: 28.81 ML
LEFT VENTRICULAR INTERNAL DIMENSION IN DIASTOLE: 3.98 CM (ref 3.5–6)
LEFT VENTRICULAR MASS: 190.62 G
LYMPHOCYTES # BLD AUTO: 2.6 K/UL (ref 1–4.8)
LYMPHOCYTES NFR BLD: 28.4 % (ref 18–48)
MAGNESIUM SERPL-MCNC: 1.9 MG/DL (ref 1.6–2.6)
MCH RBC QN AUTO: 31.3 PG (ref 27–31)
MCHC RBC AUTO-ENTMCNC: 34.4 G/DL (ref 32–36)
MCV RBC AUTO: 91 FL (ref 82–98)
MONOCYTES # BLD AUTO: 0.7 K/UL (ref 0.3–1)
MONOCYTES NFR BLD: 7.5 % (ref 4–15)
MV PEAK A VEL: 0.58 M/S
MV PEAK E VEL: 0.58 M/S
MV STENOSIS PRESSURE HALF TIME: 39.42 MS
MV VALVE AREA P 1/2 METHOD: 5.58 CM2
NEUTROPHILS # BLD AUTO: 5.5 K/UL (ref 1.8–7.7)
NEUTROPHILS NFR BLD: 61.2 % (ref 38–73)
NONHDLC SERPL-MCNC: 135 MG/DL
NRBC BLD-RTO: 0 /100 WBC
PHOSPHATE SERPL-MCNC: 4 MG/DL (ref 2.7–4.5)
PISA TR MAX VEL: 1.44 M/S
PLATELET # BLD AUTO: 209 K/UL (ref 150–450)
PMV BLD AUTO: 9.8 FL (ref 9.2–12.9)
POTASSIUM SERPL-SCNC: 4.1 MMOL/L (ref 3.5–5.1)
PROT SERPL-MCNC: 6.5 G/DL (ref 6–8.4)
PV MV: 0.47 M/S
PV PEAK VELOCITY: 0.69 CM/S
RBC # BLD AUTO: 5.02 M/UL (ref 4.6–6.2)
RIGHT VENTRICULAR END-DIASTOLIC DIMENSION: 2.94 CM
SARS-COV-2 RDRP RESP QL NAA+PROBE: NEGATIVE
SODIUM SERPL-SCNC: 137 MMOL/L (ref 136–145)
TR MAX PG: 8 MMHG
TRIGL SERPL-MCNC: 180 MG/DL (ref 30–150)
TROPONIN I SERPL DL<=0.01 NG/ML-MCNC: 2.68 NG/ML (ref 0–0.03)
TROPONIN I SERPL DL<=0.01 NG/ML-MCNC: 2.7 NG/ML (ref 0–0.03)
TROPONIN I SERPL DL<=0.01 NG/ML-MCNC: 3.22 NG/ML (ref 0–0.03)
WBC # BLD AUTO: 9.04 K/UL (ref 3.9–12.7)

## 2023-03-15 PROCEDURE — 93010 ELECTROCARDIOGRAM REPORT: CPT | Mod: ,,, | Performed by: INTERNAL MEDICINE

## 2023-03-15 PROCEDURE — 25500020 PHARM REV CODE 255: Performed by: STUDENT IN AN ORGANIZED HEALTH CARE EDUCATION/TRAINING PROGRAM

## 2023-03-15 PROCEDURE — 63600175 PHARM REV CODE 636 W HCPCS: Performed by: INTERNAL MEDICINE

## 2023-03-15 PROCEDURE — 94761 N-INVAS EAR/PLS OXIMETRY MLT: CPT

## 2023-03-15 PROCEDURE — 27000221 HC OXYGEN, UP TO 24 HOURS

## 2023-03-15 PROCEDURE — 83735 ASSAY OF MAGNESIUM: CPT | Performed by: STUDENT IN AN ORGANIZED HEALTH CARE EDUCATION/TRAINING PROGRAM

## 2023-03-15 PROCEDURE — 85025 COMPLETE CBC W/AUTO DIFF WBC: CPT | Performed by: INTERNAL MEDICINE

## 2023-03-15 PROCEDURE — 25000003 PHARM REV CODE 250: Performed by: INTERNAL MEDICINE

## 2023-03-15 PROCEDURE — 93005 ELECTROCARDIOGRAM TRACING: CPT

## 2023-03-15 PROCEDURE — 80061 LIPID PANEL: CPT | Performed by: INTERNAL MEDICINE

## 2023-03-15 PROCEDURE — 84484 ASSAY OF TROPONIN QUANT: CPT | Performed by: INTERNAL MEDICINE

## 2023-03-15 PROCEDURE — G0378 HOSPITAL OBSERVATION PER HR: HCPCS

## 2023-03-15 PROCEDURE — U0002 COVID-19 LAB TEST NON-CDC: HCPCS | Performed by: STUDENT IN AN ORGANIZED HEALTH CARE EDUCATION/TRAINING PROGRAM

## 2023-03-15 PROCEDURE — A4216 STERILE WATER/SALINE, 10 ML: HCPCS | Performed by: INTERNAL MEDICINE

## 2023-03-15 PROCEDURE — 99238 PR HOSPITAL DISCHARGE DAY,<30 MIN: ICD-10-PCS | Mod: ,,, | Performed by: STUDENT IN AN ORGANIZED HEALTH CARE EDUCATION/TRAINING PROGRAM

## 2023-03-15 PROCEDURE — 96372 THER/PROPH/DIAG INJ SC/IM: CPT | Performed by: INTERNAL MEDICINE

## 2023-03-15 PROCEDURE — 80053 COMPREHEN METABOLIC PANEL: CPT | Performed by: INTERNAL MEDICINE

## 2023-03-15 PROCEDURE — 82553 CREATINE MB FRACTION: CPT | Performed by: INTERNAL MEDICINE

## 2023-03-15 PROCEDURE — 93010 EKG 12-LEAD: ICD-10-PCS | Mod: ,,, | Performed by: INTERNAL MEDICINE

## 2023-03-15 PROCEDURE — 36415 COLL VENOUS BLD VENIPUNCTURE: CPT | Performed by: INTERNAL MEDICINE

## 2023-03-15 PROCEDURE — 36415 COLL VENOUS BLD VENIPUNCTURE: CPT | Performed by: STUDENT IN AN ORGANIZED HEALTH CARE EDUCATION/TRAINING PROGRAM

## 2023-03-15 PROCEDURE — 84100 ASSAY OF PHOSPHORUS: CPT | Performed by: INTERNAL MEDICINE

## 2023-03-15 PROCEDURE — 99238 HOSP IP/OBS DSCHRG MGMT 30/<: CPT | Mod: ,,, | Performed by: STUDENT IN AN ORGANIZED HEALTH CARE EDUCATION/TRAINING PROGRAM

## 2023-03-15 RX ADMIN — Medication 10 ML: at 06:03

## 2023-03-15 RX ADMIN — LISINOPRIL 20 MG: 20 TABLET ORAL at 09:03

## 2023-03-15 RX ADMIN — ASPIRIN 81 MG: 81 TABLET, CHEWABLE ORAL at 09:03

## 2023-03-15 RX ADMIN — METOPROLOL SUCCINATE 25 MG: 25 TABLET, EXTENDED RELEASE ORAL at 09:03

## 2023-03-15 RX ADMIN — ENOXAPARIN SODIUM 105 MG: 150 INJECTION SUBCUTANEOUS at 06:03

## 2023-03-15 RX ADMIN — SULFUR HEXAFLUORIDE 5 ML: KIT at 11:03

## 2023-03-15 NOTE — NURSING
Report called to [place] at [facility]. Patient is to be transferred to Room 245 to the services of KATIE [facility]. Awaiting [facility] transportation.

## 2023-03-15 NOTE — CARE UPDATE
03/15/23 0923   Patient Assessment/Suction   Level of Consciousness (AVPU) alert   PRE-TX-O2   Device (Oxygen Therapy) nasal cannula   Flow (L/min) (S)  1  (nc placed on standby pt on room air)   SpO2 95 %   $ Pulse Oximetry - Multiple Charge Pulse Oximetry - Multiple   Pulse 86   Resp 20

## 2023-03-15 NOTE — PLAN OF CARE
03/15/23 1253   Discharge Assessment   Assessment Type Discharge Planning Assessment     Patient is being transferred to another facility for higher level of care.

## 2023-03-15 NOTE — DISCHARGE SUMMARY
[facility]  Discharge Summary      Patient Name: Ramon Dhaliwal  MRN: [id number]  [facility]: [id number]  Patient Class: OP- Observation  Admission Date: 3/14/2023  Hospital Length of Stay: 0 days  Discharge Date and Time: 3/15/2023  2:45 PM  Attending Physician: Alanna att. providers found   Discharging Provider: Karlos Guerrero MD  Primary Care Provider: Primary Doctor Alanna    Primary Care Team: Networked reference to record PCT     HPI:   Patient is a 57-year-old  male with past medical history significant for hypertension, hyperlipidemia, morbid obesity by medical criteria who is being admitted to Hospital Medicine from [facility] Emergency Room with complaints of progressively worsening shortness of breath and chest pain for 3-4 days.  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  Patient denies any personal or family history of any blood clots.  For the past 2-3 days patient is experiencing chest heaviness, retrosternal area, nonradiating with associated shortness of breath.  Shortness of breath is mostly exertional.  Patient denies any associated paroxysmal nocturnal dyspnea but feels better upon sitting up.  Patient states this shortness of breath is making him anxious.  Patient drinks 2 cups of coffee daily and drinks alcohol on occasional basis.  Patient went to urgent care facility about 4 days ago with similar symptoms as he ran out his lisinopril prescription.  Reportedly patient had blood work completed.  No recent fever, cough, expectoration, sick contact or prior history of heart disease reported.  Upon arrival to the emergency room patient noted to be in atrial fibrillation with rapid ventricular response with heart rate ranging between 120-140 beats per minute.  Patient's atrial fibrillation converted to normal sinus rhythm spontaneously after receiving intravenous  Lopressor 5 mg x 1.  Currently patient is on 2 liter/minute supplemental oxygen via nasal cannula.            * No surgery found *      Hospital Course:   Patient admitted for new onset Afib w/ elevated troponin in setting of chest pain. Patient converted quickly to sinus rhythm. Patient aspirin loaded in ED. Started on weight based lovenox. Patient monitored overnight and continued to have elevation in troponin but no return of chest pain. Patient stated that he has had worsening substernal chest pain for the past 2-3 days leading up to admit. None since admit. TTE completed. Spoke w/ cardiology who recommended transfer to facility that has cath capabilities.        Goals of Care Treatment Preferences:  Code Status: Full Code      Consults:   Consults (From admission, onward)          Status Ordering Provider     Case Management/  Once        Provider:  (Not yet assigned)    Acknowledged JERRY WEINSTEIN     Inpatient consult to Cardiology  Once        Provider:  Alejandro Yanez MD    Acknowledged JERRY WEINSTEIN            No new Assessment & Plan notes have been filed under this hospital service since the last note was generated.  Service: Hospital Medicine    Final Active Diagnoses:    Diagnosis Date Noted POA    PRINCIPAL PROBLEM:  Atrial fibrillation with RVR, new onset [I48.91] 03/14/2023 Yes    Morbid obesity [E66.01] 03/14/2023 Yes    Dyspnea [R06.00] 03/14/2023 Yes    Primary hypertension [I10] 03/14/2023 Yes    Hyperlipidemia [E78.5] 03/14/2023 Yes    Elevated troponin [R77.8] 03/14/2023 Yes    ACP (advance care planning) [Z71.89] 03/14/2023 Not Applicable      Problems Resolved During this Admission:       Discharged Condition: stable    Disposition: Another Health Care Inst*    Follow Up:    Patient Instructions:   No discharge procedures on file.    Significant Diagnostic Studies: Labs: All labs within the past 24 hours have been reviewed    Pending Diagnostic Studies:       Procedure Component  Value Units Date/Time    EKG 12-lead [[id number]] Collected: 03/15/23 0832    Order Status: Sent Lab Status: In process Updated: 03/15/23 1529    Narrative:      Test Reason : I21.4,    Vent. Rate : 083 BPM     Atrial Rate : 083 BPM     P-R Int : 126 ms          QRS Dur : 080 ms      QT Int : 344 ms       P-R-T Axes : 050 059 051 degrees     QTc Int : 404 ms    Normal sinus rhythm  Cannot rule out Anterior infarct ,age undetermined  Abnormal ECG  When compared with ECG of 14-MAR-2023 15:18,  T wave amplitude has decreased in Lateral leads    Referred By: [email or web address]   SELF           Confirmed By:            Medications:  Reconciled Home Medications:      Medication List        ASK your doctor about these medications      aspirin 81 MG Chew  Take 81 mg by mouth once daily.     lisinopriL 20 MG tablet  Commonly known as: PRINIVIL,ZESTRIL  Take 20 mg by mouth once daily.              Indwelling Lines/Drains at time of discharge:   Lines/Drains/Airways       None                   Time spent on the discharge of patient: 25 minutes         Karlos Guerrero MD  Department of Hospital Medicine  [place] - Intensive Care

## 2023-03-15 NOTE — HOSPITAL COURSE
Patient admitted for new onset Afib w/ elevated troponin in setting of chest pain. Patient converted quickly to sinus rhythm. Patient aspirin loaded in ED. Started on weight based lovenox. Patient monitored overnight and continued to have elevation in troponin but no return of chest pain. Patient stated that he has had worsening substernal chest pain for the past 2-3 days leading up to admit. None since admit. TTE completed. Spoke w/ cardiology who recommended transfer to facility that has cath capabilities.

## 2023-03-15 NOTE — PLAN OF CARE
Problem: Gas Exchange Impaired  Goal: Optimal Gas Exchange  Outcome: Ongoing, Progressing   Patient in no apparent distress. Sat's 97  % on 2 lpm, decreased to 1 lpm . Will continue to monitor.

## 2023-03-15 NOTE — NURSING
Patient transferred to [facility] via ambulance by [facility] staff. No s/s distress noted at time of transfer.